# Patient Record
Sex: FEMALE | Race: WHITE | URBAN - METROPOLITAN AREA
[De-identification: names, ages, dates, MRNs, and addresses within clinical notes are randomized per-mention and may not be internally consistent; named-entity substitution may affect disease eponyms.]

---

## 2018-01-15 ENCOUNTER — TRANSFERRED RECORDS (OUTPATIENT)
Dept: HEALTH INFORMATION MANAGEMENT | Facility: CLINIC | Age: 53
End: 2018-01-15

## 2018-01-15 LAB — PAP-ABSTRACT: NORMAL

## 2018-02-26 ENCOUNTER — OFFICE VISIT (OUTPATIENT)
Dept: FAMILY MEDICINE | Facility: CLINIC | Age: 53
End: 2018-02-26
Payer: COMMERCIAL

## 2018-02-26 ENCOUNTER — RADIANT APPOINTMENT (OUTPATIENT)
Dept: GENERAL RADIOLOGY | Facility: CLINIC | Age: 53
End: 2018-02-26
Attending: NURSE PRACTITIONER
Payer: COMMERCIAL

## 2018-02-26 VITALS
SYSTOLIC BLOOD PRESSURE: 99 MMHG | WEIGHT: 135 LBS | BODY MASS INDEX: 22.49 KG/M2 | DIASTOLIC BLOOD PRESSURE: 47 MMHG | HEIGHT: 65 IN | TEMPERATURE: 99.2 F | OXYGEN SATURATION: 97 % | HEART RATE: 73 BPM

## 2018-02-26 DIAGNOSIS — M54.50 ACUTE BILATERAL LOW BACK PAIN WITHOUT SCIATICA: ICD-10-CM

## 2018-02-26 DIAGNOSIS — M54.50 ACUTE BILATERAL LOW BACK PAIN WITHOUT SCIATICA: Primary | ICD-10-CM

## 2018-02-26 PROCEDURE — 99203 OFFICE O/P NEW LOW 30 MIN: CPT | Performed by: NURSE PRACTITIONER

## 2018-02-26 PROCEDURE — 72100 X-RAY EXAM L-S SPINE 2/3 VWS: CPT | Mod: FY

## 2018-02-26 RX ORDER — POLYETHYLENE GLYCOL 3350 17 G/17G
1 POWDER, FOR SOLUTION ORAL DAILY
COMMUNITY

## 2018-02-26 RX ORDER — CYCLOBENZAPRINE HCL 10 MG
10 TABLET ORAL 3 TIMES DAILY PRN
Qty: 30 TABLET | Refills: 1 | Status: SHIPPED | OUTPATIENT
Start: 2018-02-26 | End: 2018-03-08

## 2018-02-26 NOTE — LETTER
[facility]  [address] [facility] [address]  Phone: [phone]    February 26, 2018        Rima Ibarra  [address] [facility] [address]          To whom it may concern:    RE: Rima Ibarra    Patient was seen and treated today at our clinic and missed work 2/21-2/28/18.     Please contact me for questions or concerns.      Sincerely,        ZIA Singh CNP

## 2018-02-26 NOTE — PATIENT INSTRUCTIONS
Causes of Lumbar (Low Back) Pain  Low back pain can be caused by problems with any part of the lumbar spine. A disk can herniate (push out) and press on a nerve. Vertebrae can rub against each other or slip out of place. This can irritate facet joints and nerves. It can also lead to stenosis, a narrowing of the spinal canal or foramen.  Pressure from a disk  Constant wear and tear on a disk can cause it to weaken and push outward. Part of the disk may then press on nearby nerves. There are two common types of herniated disks:  Contained means the soft nucleus is protruding outward.   Extruded means the firm annulus has torn, letting the soft center squeeze through.     Pressure from bone  An unstable spine   With age, a disk may thin and wear out. Vertebrae above and below the disk may begin to touch. This can put pressure on nerves. It can also cause bone spurs (growths) to form where the bones rub together.    Stenosis results when bone spurs narrow the foramen or spinal canal. This also puts pressure on nerves. Slipping vertebrae can irritate nerves and joints. They can also worsen stenosis.    In some cases, vertebrae become unstable and slip forward. This is called spondylolisthesis.     Date Last Reviewed: 10/12/2015    4143-7154 [employer]. [address] [place] [address], [place] [address]. All rights reserved. This information is not intended as a substitute for professional medical care. Always follow your healthcare professional's instructions.

## 2018-02-26 NOTE — PROGRESS NOTES
"  SUBJECTIVE:   Rima Milena Ibarra is a 52 year old female who presents to clinic today for the following health issues:      Musculoskeletal problem/pain      Duration: 6 days    Description  Location: low/middle back    Intensity:  severe    Accompanying signs and symptoms: none    History  Previous similar problem: YES- 4 years ago but not as bad  Previous evaluation:  Chiropractor    Precipitating or alleviating factors:  Trauma or overuse: YES- putting on her socks  Aggravating factors include: standing to sitting changing positions    Therapies tried and outcome: ice and stretching    Problem list and histories reviewed & adjusted, as indicated.  Additional history: as documented      Social History     Social History     Marital status:      Spouse name: N/A     Number of children: N/A     Years of education: N/A     Social History Main Topics     Smoking status: Former Smoker     Smokeless tobacco: Never Used     Alcohol use Yes     Drug use: No     Sexual activity: Yes     Other Topics Concern     Not on file     Social History Narrative     No narrative on file         Reviewed and updated as needed this visit by clinical staff  Allergies  Meds       Reviewed and updated as needed this visit by Provider         ROS:  Constitutional, HEENT, cardiovascular, pulmonary, GI, , musculoskeletal, neuro, skin, endocrine and psych systems are negative, except as otherwise noted.    OBJECTIVE:     BP 99/47  Pulse 73  Temp 99.2  F (37.3  C) (Oral)  Ht 5' 5\" (1.651 m)  Wt 135 lb (61.2 kg)  SpO2 97%  BMI 22.47 kg/m2  Body mass index is 22.47 kg/(m^2).  GENERAL: alert and no distress  RESP: lungs clear to auscultation - no rales, rhonchi or wheezes  CV: regular rate and rhythm, normal S1 S2, no S3 or S4, no murmur, click or rub, no peripheral edema and peripheral pulses strong  ABDOMEN: soft, nontender, no hepatosplenomegaly, no masses and bowel sounds normal  Back: Generalized lumbar " tenderness, not on spine appears more muscular. Very limited range of motion, flexion. Movement is not on palpation but with position changing, sitting to standing. No sciatica, dtr's normal, straight leg negative.   SKIN: no suspicious lesions or rashes  NEURO: Normal strength and tone, mentation intact and speech normal  PSYCH: mentation appears normal, affect normal/bright    Diagnostic Test Results:  Xray - IMPRESSION: Vertebral heights and disc spaces are preserved without  evidence of fracture. No significant listhesis identified at any  level.       ASSESSMENT/PLAN:       1. Acute bilateral low back pain without sciatica    Discussed xray results, note given for work 2/21-2/28  - ORTHO  REFERRAL, to follow up if not improving or worsening  - cyclobenzaprine (FLEXERIL) 10 MG tablet; Take 1 tablet (10 mg) by mouth 3 times daily as needed for muscle spasms  Dispense: 30 tablet; Refill: 1    See Patient Instructions  Patient Instructions       Causes of Lumbar (Low Back) Pain  Low back pain can be caused by problems with any part of the lumbar spine. A disk can herniate (push out) and press on a nerve. Vertebrae can rub against each other or slip out of place. This can irritate facet joints and nerves. It can also lead to stenosis, a narrowing of the spinal canal or foramen.  Pressure from a disk  Constant wear and tear on a disk can cause it to weaken and push outward. Part of the disk may then press on nearby nerves. There are two common types of herniated disks:  Contained means the soft nucleus is protruding outward.   Extruded means the firm annulus has torn, letting the soft center squeeze through.     Pressure from bone  An unstable spine   With age, a disk may thin and wear out. Vertebrae above and below the disk may begin to touch. This can put pressure on nerves. It can also cause bone spurs (growths) to form where the bones rub together.    Stenosis results when bone spurs narrow the foramen  or spinal canal. This also puts pressure on nerves. Slipping vertebrae can irritate nerves and joints. They can also worsen stenosis.    In some cases, vertebrae become unstable and slip forward. This is called spondylolisthesis.     Date Last Reviewed: 10/12/2015    0047-4717 [employer]. [address] [place] [address]. All rights reserved. This information is not intended as a substitute for professional medical care. Always follow your healthcare professional's instructions.            ZIA Singh [facility]

## 2018-02-26 NOTE — MR AVS SNAPSHOT
After Visit Summary   2/26/2018    Rima Ibarra    MRN: [id number]           Patient Information     Date Of Birth          1965        Visit Information        Provider Department      2/26/2018 12:20 PM Sharlene Dunham APRN CNP [facility]        Today's Diagnoses     Acute bilateral low back pain without sciatica    -  1      Care Instructions      Causes of Lumbar (Low Back) Pain  Low back pain can be caused by problems with any part of the lumbar spine. A disk can herniate (push out) and press on a nerve. Vertebrae can rub against each other or slip out of place. This can irritate facet joints and nerves. It can also lead to stenosis, a narrowing of the spinal canal or foramen.  Pressure from a disk  Constant wear and tear on a disk can cause it to weaken and push outward. Part of the disk may then press on nearby nerves. There are two common types of herniated disks:  Contained means the soft nucleus is protruding outward.   Extruded means the firm annulus has torn, letting the soft center squeeze through.     Pressure from bone  An unstable spine   With age, a disk may thin and wear out. Vertebrae above and below the disk may begin to touch. This can put pressure on nerves. It can also cause bone spurs (growths) to form where the bones rub together.    Stenosis results when bone spurs narrow the foramen or spinal canal. This also puts pressure on nerves. Slipping vertebrae can irritate nerves and joints. They can also worsen stenosis.    In some cases, vertebrae become unstable and slip forward. This is called spondylolisthesis.     Date Last Reviewed: 10/12/2015    0644-9312 [employer]. [address] [place] [address], [place] [address]. All rights reserved. This information is not intended as a substitute for professional medical care. Always follow your healthcare professional's instructions.                Follow-ups after your visit         Additional Services     ORTHO  REFERRAL       [facility] is referring you to the Orthopedic  Services at [facility].       The  Representative will assist you in the coordination of your Orthopedic and Musculoskeletal Care as prescribed by your physician.    The  Representative will call you within 1 business day to help schedule your appointment, or you may contact the  Representative at:    All areas ~ [phone]     Type of Referral : Non Surgical       Timeframe requested: 1 - 2 days    Coverage of these services is subject to the terms and limitations of your health insurance plan.  Please call member services at your health plan with any benefit or coverage questions.      If X-rays, CT or MRI's have been performed, please contact the facility where they were done to arrange for , prior to your scheduled appointment.  Please bring this referral request to your appointment and present it to your specialist.                  Who to contact     If you have questions or need follow up information about today's clinic visit or your schedule please contact [facility] directly at [phone].  Normal or non-critical lab and imaging results will be communicated to you by MyChart, letter or phone within 4 business days after the clinic has received the results. If you do not hear from us within 7 days, please contact the clinic through MyChart or phone. If you have a critical or abnormal lab result, we will notify you by phone as soon as possible.  Submit refill requests through [employer] or call your pharmacy and they will forward the refill request to us. Please allow 3 business days for your refill to be completed.          Additional Information About Your Visit        [employer] Information     [employer] lets you send messages to your doctor, view your test results, renew your prescriptions, schedule  "appointments and more. To sign up, go to [email or web address] . Click on \"Log in\" on the left side of the screen, which will take you to the Welcome page. Then click on \"Sign up Now\" on the right side of the page.     You will be asked to enter the access code listed below, as well as some personal information. Please follow the directions to create your username and password.     Your access code is: [email or web address]  Expires: 2018  1:41 PM     Your access code will  in 90 days. If you need help or a new code, please call your [place] clinic or [phone].        Care EveryWhere ID     This is your Care EveryWhere ID. This could be used by other organizations to access your [place] medical records  [id number]        Your Vitals Were     Pulse Temperature Height Pulse Oximetry BMI (Body Mass Index)       73 99.2  F (37.3  C) (Oral) 5' 5\" (1.651 m) 97% 22.47 kg/m2        Blood Pressure from Last 3 Encounters:   18 99/47    Weight from Last 3 Encounters:   18 135 lb (61.2 kg)              We Performed the Following     ORTHO  REFERRAL        Primary Care Provider Office Phone # Fax #    [facility] [phone] [address] [facility] [address]        Equal Access to Services     GALA KING : Hadii aad ku hadasho Soomaali, waaxda luqadaha, qaybta kaalmada adeegyada, bj palma . So [facility] [phone].    ATENCIÓN: Si habla español, tiene a brown disposición servicios gratuitos de asistencia lingüística. Llame al [phone].    We comply with applicable federal civil rights laws and [place] laws. We do not discriminate on the basis of race, color, national origin, age, disability, sex, sexual orientation, or gender identity.            Thank you!     Thank you for choosing [facility]  for your care. Our goal is always to provide you with excellent care. Hearing back from our patients is one way we can " continue to improve our services. Please take a few minutes to complete the written survey that you may receive in the mail after your visit with us. Thank you!             Your Updated Medication List - Protect others around you: Learn how to safely use, store and throw away your medicines at www.disposemymeds.org.          This list is accurate as of 2/26/18  1:41 PM.  Always use your most recent med list.                   Brand Name Dispense Instructions for use Diagnosis    MULTI VITAMIN DAILY PO      Take 1 tablet by mouth daily        polyethylene glycol powder    MIRALAX/GLYCOLAX     Take 1 capful by mouth daily        UNKNOWN TO PATIENT      Birth control

## 2018-02-27 ENCOUNTER — OFFICE VISIT (OUTPATIENT)
Dept: ORTHOPEDICS | Facility: CLINIC | Age: 53
End: 2018-02-27
Payer: COMMERCIAL

## 2018-02-27 VITALS
HEIGHT: 65 IN | BODY MASS INDEX: 22.49 KG/M2 | DIASTOLIC BLOOD PRESSURE: 72 MMHG | SYSTOLIC BLOOD PRESSURE: 106 MMHG | WEIGHT: 135 LBS

## 2018-02-27 DIAGNOSIS — M54.50 ACUTE MIDLINE LOW BACK PAIN WITHOUT SCIATICA: Primary | ICD-10-CM

## 2018-02-27 PROCEDURE — 99244 OFF/OP CNSLTJ NEW/EST MOD 40: CPT | Performed by: PEDIATRICS

## 2018-02-27 RX ORDER — METHYLPREDNISOLONE 4 MG
TABLET, DOSE PACK ORAL
Qty: 21 TABLET | Refills: 0 | Status: SHIPPED | OUTPATIENT
Start: 2018-02-27

## 2018-02-27 RX ORDER — NORETHINDRONE ACETATE AND ETHINYL ESTRADIOL .02; 1 MG/1; MG/1
1 TABLET ORAL
COMMUNITY
Start: 2018-01-15

## 2018-02-27 RX ORDER — HYDROCODONE BITARTRATE AND ACETAMINOPHEN 5; 325 MG/1; MG/1
1 TABLET ORAL EVERY 8 HOURS PRN
Qty: 10 TABLET | Refills: 0 | Status: SHIPPED | OUTPATIENT
Start: 2018-02-27

## 2018-02-27 NOTE — LETTER
[facility]  [address] [address] [address]  [facility] [address]  Phone: [phone]  Fax: [phone]      February 27, 2018      RE: Rima Ibarra  [address] [facility] [address]        To whom it may concern:    Rima Ibarra was seen in clinic today. She will remain off work for one additional week, through 3/6/18.      Sincerely,                  Avila MESSER

## 2018-02-27 NOTE — LETTER
2/27/2018         RE: Rima Ibarra  [address] [facility] [address]        Dear Colleague,    Thank you for referring your patient, Rima Ibarra, to the [facility] [place]. Please see a copy of my visit note below.    Sports Medicine Clinic Visit    PCP: Clinic, [facility]    Rima Ibarra is a 52 year old female who is seen  in consultation at the request of  Sharlene Dunham C.N.P. presenting with low back pain.  Patient was putting on her socks 1 week ago and felt her low back lock on her.  She did have some improvement by the end of the week after seeing her chiropractor a few times.  This Sunday her pain returned and she feels she has a lack of ROM.  Was seen by primary care yesterday, x rays and were given a muscle relaxant.  Pain with any movements, hard to sleep due to being woken by pain.  Has had a few times of pain in her right anterior thigh.  Denies any pain down the back of her legs.    Difficulty with staying up on her heels.     **  Pain in the low back was getting better initially until Friday. Patient went to the Chiropractor on Friday. [place] that the back pain got worse over the weekend. Pain most prominent in the lower lumbar area in the midline. Does have locking sensation. Increased pain with transitioning from lying down to sitting. Denies numbness and tingling down the lower extremities.    Injury: acute flare of low back pain     Location of Pain: bilateral low back pain   Duration of Pain: 1 week(s)  Rating of Pain at worst: 8/10  Rating of Pain Currently: 6/10  Symptoms are better with: Nothing  Symptoms are worse with: movement   Additional Features:   Positive: locking   Negative: swelling, bruising, popping, grinding, catching, instability, paresthesias, numbness, weakness, pain in other joints and systemic symptoms  Other evaluation and/or treatments so far consists of: x rays   Prior History of  "related problems: HX of pain about 4 years ago, treated by chiropractor     Social History: para for special ed students.     Rima was asked to complete the Oswestry Low Back Disability Index  today in the office.  Disability score: 70%.       Review of Systems  Musculoskeletal: as above  Remainder of review of systems is negative including constitutional, CV, pulmonary, GI, Skin and Neurologic except as noted in HPI or medical history.    This document serves as a record of the services and decisions personally performed and made by Avila Griffiths DO, CAQ. It was created on his behalf by Dionicio Gonzalez, a trained medical scribe. The creation of this document is based the provider's statements to the medical scribe.  Dionicio Gonzalez February 27, 2018 7:13 PM     PMHx: Above  PSHx: Noncontributory  Fam hx: Noncontributory    Social History     Social History     Marital status:      Spouse name: N/A     Number of children: N/A     Years of education: N/A     Occupational History     Not on file.     Social History Main Topics     Smoking status: Former Smoker     Smokeless tobacco: Never Used     Alcohol use Yes     Drug use: No     Sexual activity: Yes     Other Topics Concern     Not on file     Social History Narrative       Objective  /72  Ht 5' 5\" (1.651 m)  Wt 135 lb (61.2 kg)  BMI 22.47 kg/m2    GENERAL APPEARANCE: healthy, alert and no distress   GAIT: antalgic and broad-based  SKIN: no suspicious lesions or rashes  NEURO: Normal strength and tone, mentation intact and speech normal  PSYCH:  mentation appears normal and affect normal/bright  HEENT: no scleral icterus  CV: no extremity edema   RESP: nonlabored breathing    Low back exam:    Inspection:       no visible deformity in the low back       normal skin       normal vascular       normal lymphatic    ROM:       Flexion limited - increased pain across low back ++        Extension limited due to pain across to the low back        " Lateral flexion to the right limited with pain across the back, no change in pain with limited left motion     Tender:       Mild right SI joint   Mild right paraspinals    Non Tender:       remainder of lumbar spine    Strength:       hip flexion 5/5, increased pain in the low back bilaterally        knee extension 5/5       ankle dorsiflexion 5/5       ankle plantarflexion 5/5       dorsiflexion of the great toe 5/5       Knee flexion 5/5         Reflexes:       patellar (L3, L4) symmetric normal       achilles tendons (S1) symmetric normal    Sensation:      grossly intact throughout lower extremities    Skin:       well perfused       capillary refill brisk    Special tests:        slump test neg (-) bilaterally, pain in the low back with bilateral motion (R>L)      Radiology:  Visualized radiographs of from 1/26/2018, and reviewed the images with the patient.  Impression: No acute bony abnormality identified.    Results for orders placed or performed in visit on 02/26/18   XR Lumbar Spine 2/3 Views    Narrative    XR LUMBAR SPINE 2-3 VIEWS 2/26/2018 1:00 PM    COMPARISON: None.    HISTORY: Acute bilateral low back pain without sciatica.      Impression    IMPRESSION: Vertebral heights and disc spaces are preserved without  evidence of fracture. No significant listhesis identified at any  level.    CHRISSY TAFOYA MD     Assessment:  1. Acute midline low back pain without sciatica      Suspect facet joint involvement given pain with extension, midline pain    Plan:  Discussed the assessment with the patient and her .    Plain films of the area reviewed with the patient.    Discussed:  *Symptom Treatment - Ice, heat  Over the Counter Medications   *Activity Modification - letter for work   *Imaging - X-Ray reviewed  *Medication - oral steroid, antiinflammatory, pain medication   *Rehab - Formal PT    *Acupuncture, Massage Therapy, or Chiropractic care    Okay to continue chiropractic care if desired and  beneficial    Topical Treatments: Ice for soreness or heat prn   Over the counter medication: Patient's preferred OTC medication as directed on packaging.  Prescription Medication as directed: Medrol Dose Pack, may use muscle relaxant (has), Hydrocodone-acetaminophen    They (more her ) inquired about pain medication in particular.  We discussed short course, one time prescription of Norco.  Over-the-counter medication is first-line.   Discussed potential benefit from oral steroid, she desired to try.   Pertinent potential adverse effect profile of prescribed medication(s) was discussed with the patient, who expressed understanding.  Activity Modification: as discussed; activities as tolerated   Letter for work provided; extend time off work   Rehab: provided Home Exercise Program for stretching in clinic .  Declined PT for now, may call for referral.  Likely physical therapy next if symptoms persist despite treatment above.  Follow up: in 7-10 days if not improving; otherwise, as needed   We discussed potentially concerning signs and symptoms related to the condition(s) listed above, including increase in pain, changing pain, neurologic symptoms, and the patient was instructed to seek appropriate medical care if noted. All questions answered to patient's satisfaction. The patient expressed understanding of the plan.      Avila Griffiths DO, CAQ    CC: Sharelne Dunham       Disclaimer: This note consists of symbols derived from keyboarding, dictation and/or voice recognition software. As a result, there may be errors in the script that have gone undetected. Please consider this when interpreting information found in this chart.    The information in this document, created by the medical scribe for me, accurately reflects the services I personally performed and the decisions made by me. I have reviewed and approved this document for accuracy.   Avila Griffiths DO, CAQ    Again, thank you for allowing me  to participate in the care of your patient.        Sincerely,        Avila Griffiths, DO

## 2018-02-28 NOTE — PROGRESS NOTES
Sports Medicine Clinic Visit    PCP: [place], [facility]    Rima Milena Ibarra is a 52 year old female who is seen  in consultation at the request of  Sharlene Dunham C.N.P. presenting with low back pain.  Patient was putting on her socks 1 week ago and felt her low back lock on her.  She did have some improvement by the end of the week after seeing her chiropractor a few times.  This Sunday her pain returned and she feels she has a lack of ROM.  Was seen by primary care yesterday, x rays and were given a muscle relaxant.  Pain with any movements, hard to sleep due to being woken by pain.  Has had a few times of pain in her right anterior thigh.  Denies any pain down the back of her legs.    Difficulty with staying up on her heels.     **  Pain in the low back was getting better initially until Friday. Patient went to the Chiropractor on Friday. [place] that the back pain got worse over the weekend. Pain most prominent in the lower lumbar area in the midline. Does have locking sensation. Increased pain with transitioning from lying down to sitting. Denies numbness and tingling down the lower extremities.    Injury: acute flare of low back pain     Location of Pain: bilateral low back pain   Duration of Pain: 1 week(s)  Rating of Pain at worst: 8/10  Rating of Pain Currently: 6/10  Symptoms are better with: Nothing  Symptoms are worse with: movement   Additional Features:   Positive: locking   Negative: swelling, bruising, popping, grinding, catching, instability, paresthesias, numbness, weakness, pain in other joints and systemic symptoms  Other evaluation and/or treatments so far consists of: x rays   Prior History of related problems: HX of pain about 4 years ago, treated by chiropractor     Social History: para for special ed students.     Rima was asked to complete the Oswestry Low Back Disability Index  today in the office.  Disability score: 70%.       Review of Systems  Musculoskeletal: as  "above  Remainder of review of systems is negative including constitutional, CV, pulmonary, GI, Skin and Neurologic except as noted in HPI or medical history.    This document serves as a record of the services and decisions personally performed and made by Avila Griffiths DO, CAQ. It was created on his behalf by Dionicio Gonzalez, a trained medical scribe. The creation of this document is based the provider's statements to the medical scribe.  Dionicio Gonzalez February 27, 2018 7:13 PM     PMHx: Above  PSHx: Noncontributory  Fam hx: Noncontributory    Social History     Social History     Marital status:      Spouse name: N/A     Number of children: N/A     Years of education: N/A     Occupational History     Not on file.     Social History Main Topics     Smoking status: Former Smoker     Smokeless tobacco: Never Used     Alcohol use Yes     Drug use: No     Sexual activity: Yes     Other Topics Concern     Not on file     Social History Narrative       Objective  /72  Ht 5' 5\" (1.651 m)  Wt 135 lb (61.2 kg)  BMI 22.47 kg/m2    GENERAL APPEARANCE: healthy, alert and no distress   GAIT: antalgic and broad-based  SKIN: no suspicious lesions or rashes  NEURO: Normal strength and tone, mentation intact and speech normal  PSYCH:  mentation appears normal and affect normal/bright  HEENT: no scleral icterus  CV: no extremity edema   RESP: nonlabored breathing    Low back exam:    Inspection:       no visible deformity in the low back       normal skin       normal vascular       normal lymphatic    ROM:       Flexion limited - increased pain across low back ++        Extension limited due to pain across to the low back        Lateral flexion to the right limited with pain across the back, no change in pain with limited left motion     Tender:       Mild right SI joint   Mild right paraspinals    Non Tender:       remainder of lumbar spine    Strength:       hip flexion 5/5, increased pain in the low back " bilaterally        knee extension 5/5       ankle dorsiflexion 5/5       ankle plantarflexion 5/5       dorsiflexion of the great toe 5/5       Knee flexion 5/5         Reflexes:       patellar (L3, L4) symmetric normal       achilles tendons (S1) symmetric normal    Sensation:      grossly intact throughout lower extremities    Skin:       well perfused       capillary refill brisk    Special tests:        slump test neg (-) bilaterally, pain in the low back with bilateral motion (R>L)      Radiology:  Visualized radiographs of from 1/26/2018, and reviewed the images with the patient.  Impression: No acute bony abnormality identified.    Results for orders placed or performed in visit on 02/26/18   XR Lumbar Spine 2/3 Views    Narrative    XR LUMBAR SPINE 2-3 VIEWS 2/26/2018 1:00 PM    COMPARISON: None.    HISTORY: Acute bilateral low back pain without sciatica.      Impression    IMPRESSION: Vertebral heights and disc spaces are preserved without  evidence of fracture. No significant listhesis identified at any  level.    CHRISSY TAFOYA MD     Assessment:  1. Acute midline low back pain without sciatica      Suspect facet joint involvement given pain with extension, midline pain    Plan:  Discussed the assessment with the patient and her .    Plain films of the area reviewed with the patient.    Discussed:  *Symptom Treatment - Ice, heat  Over the Counter Medications   *Activity Modification - letter for work   *Imaging - X-Ray reviewed  *Medication - oral steroid, antiinflammatory, pain medication   *Rehab - Formal PT    *Acupuncture, Massage Therapy, or Chiropractic care    Okay to continue chiropractic care if desired and beneficial    Topical Treatments: Ice for soreness or heat prn   Over the counter medication: Patient's preferred OTC medication as directed on packaging.  Prescription Medication as directed: Medrol Dose Pack, may use muscle relaxant (has), Hydrocodone-acetaminophen    They (more her  ) inquired about pain medication in particular.  We discussed short course, one time prescription of Norco.  Over-the-counter medication is first-line.   Discussed potential benefit from oral steroid, she desired to try.   Pertinent potential adverse effect profile of prescribed medication(s) was discussed with the patient, who expressed understanding.  Activity Modification: as discussed; activities as tolerated   Letter for work provided; extend time off work   Rehab: provided Home Exercise Program for stretching in clinic .  Declined PT for now, may call for referral.  Likely physical therapy next if symptoms persist despite treatment above.  Follow up: in 7-10 days if not improving; otherwise, as needed   We discussed potentially concerning signs and symptoms related to the condition(s) listed above, including increase in pain, changing pain, neurologic symptoms, and the patient was instructed to seek appropriate medical care if noted. All questions answered to patient's satisfaction. The patient expressed understanding of the plan.      Avila Griffiths DO, CAQ    CC: Sharlene Dunham       Disclaimer: This note consists of symbols derived from keyboarding, dictation and/or voice recognition software. As a result, there may be errors in the script that have gone undetected. Please consider this when interpreting information found in this chart.    The information in this document, created by the medical scribe for me, accurately reflects the services I personally performed and the decisions made by me. I have reviewed and approved this document for accuracy.   Avila Griffiths DO, CAQ

## 2018-02-28 NOTE — PATIENT INSTRUCTIONS
Provided Medrol Dose back (Oral Steroid), use as directed     Prescribed Hydrocodone-acetaminophen, use as directed     May continue Muscle Relaxant     Ice for soreness, may try heat      Begin Home Exercises as able     Follow up in 7-10 days if not improving; otherwise, as needed

## 2018-10-30 ENCOUNTER — TELEPHONE (OUTPATIENT)
Dept: FAMILY MEDICINE | Facility: CLINIC | Age: 53
End: 2018-10-30